# Patient Record
Sex: MALE | Race: BLACK OR AFRICAN AMERICAN | NOT HISPANIC OR LATINO | URBAN - METROPOLITAN AREA
[De-identification: names, ages, dates, MRNs, and addresses within clinical notes are randomized per-mention and may not be internally consistent; named-entity substitution may affect disease eponyms.]

---

## 2024-03-11 ENCOUNTER — APPOINTMENT (OUTPATIENT)
Dept: URBAN - METROPOLITAN AREA CLINIC 208 | Age: 49
Setting detail: DERMATOLOGY
End: 2024-03-21

## 2024-03-11 DIAGNOSIS — L21.8 OTHER SEBORRHEIC DERMATITIS: ICD-10-CM

## 2024-03-11 DIAGNOSIS — Q81.2 EPIDERMOLYSIS BULLOSA DYSTROPHICA: ICD-10-CM

## 2024-03-11 PROCEDURE — OTHER OTC TREATMENT REGIMEN: OTHER

## 2024-03-11 PROCEDURE — OTHER PRESCRIPTION: OTHER

## 2024-03-11 PROCEDURE — OTHER SUNSCREEN RECOMMENDATIONS: OTHER

## 2024-03-11 PROCEDURE — OTHER MIPS QUALITY: OTHER

## 2024-03-11 PROCEDURE — OTHER COUNSELING: OTHER

## 2024-03-11 PROCEDURE — OTHER ADDITIONAL NOTES: OTHER

## 2024-03-11 PROCEDURE — 99204 OFFICE O/P NEW MOD 45 MIN: CPT

## 2024-03-11 RX ORDER — KETOCONAZOLE 20 MG/ML
SHAMPOO, SUSPENSION TOPICAL
Qty: 120 | Refills: 3 | Status: ERX | COMMUNITY
Start: 2024-03-11

## 2024-03-11 RX ORDER — KETOCONAZOLE 20 MG/G
CREAM TOPICAL
Qty: 30 | Refills: 3 | Status: ERX | COMMUNITY
Start: 2024-03-11

## 2024-03-11 RX ORDER — FLUTICASONE PROPIONATE 0.05 MG/G
OINTMENT TOPICAL
Qty: 30 | Refills: 1 | Status: ERX | COMMUNITY
Start: 2024-03-11

## 2024-03-11 ASSESSMENT — LOCATION SIMPLE DESCRIPTION DERM
LOCATION SIMPLE: RIGHT FOREARM
LOCATION SIMPLE: LEFT FOREARM
LOCATION SIMPLE: SUPERIOR FOREHEAD
LOCATION SIMPLE: FRONTAL SCALP

## 2024-03-11 ASSESSMENT — LOCATION ZONE DERM
LOCATION ZONE: FACE
LOCATION ZONE: SCALP
LOCATION ZONE: ARM

## 2024-03-11 ASSESSMENT — LOCATION DETAILED DESCRIPTION DERM
LOCATION DETAILED: LEFT PROXIMAL RADIAL DORSAL FOREARM
LOCATION DETAILED: MEDIAL FRONTAL SCALP
LOCATION DETAILED: SUPERIOR MID FOREHEAD
LOCATION DETAILED: RIGHT PROXIMAL RADIAL DORSAL FOREARM

## 2024-03-11 NOTE — HPI: RASH
What Type Of Note Output Would You Prefer (Optional)?: Bullet Format
How Severe Is Your Rash?: moderate
Is This A New Presentation, Or A Follow-Up?: Rash
Additional History: Patient states that to cut the long story short on January the 5th he was in the ER for burns to his face from working on his furnace which affected his eyes. He was given Mupirocin a d told to use Aquaphor as well. 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 Was given nystatin ointment to use on the face. But he used on left forearm to test out medication first.\\nGot burning reaction and blisters from the Nystatin ointment. He called his dermatologist as was told to d/c due to possible allergy to the nystatin and was given Desonide. Rash is spreading and pt is concerned could be Morrisbreana Guajardo’s. Was prescribed Triamcinolone ointment but he never filled it.\\nPer Dermatologist states rash could be allergy to the preservatives in the nystatin: Propylene glycol; took NDC #(have medication here)\\nSpreading to right arm that he never tested on. Was told could be allergic to something in the environment or something he is using. States using All Free and Clear. Contacted doctor and was told not systemic.\\nUsed Mupirocin ointment on one section of the left arm to compare with the desonide(used for 1 day). Tested out the nystatin x 1 day.\\nBurning sensation on onset of Nystatin use. Soreness in muscle on lower legs.\\n\\nPt states he is concerned about using anything on his skin due to fear of reacting adversely.\\n\\nPt has pictures.

## 2024-03-11 NOTE — PROCEDURE: OTC TREATMENT REGIMEN
Patient Specific Otc Recommendations (Will Not Stick From Patient To Patient): Head and Shoulders or Nizarol shampoo
Detail Level: Zone

## 2024-03-11 NOTE — PROCEDURE: SUNSCREEN RECOMMENDATIONS
Products Recommended: Recommended Sunscreen Lines:\\n\\nFor the face:\\nVaniCream Sunscreen
Detail Level: Generalized
General Sunscreen Counseling: I recommended a broad spectrum sunscreen with a SPF of 30 or higher.  I explained that SPF 30 sunscreens block approximately 97 percent of the sun's harmful rays.  Sunscreens should be applied at least 15 minutes prior to expected sun exposure and then every 2 hours after that as long as sun exposure continues. If swimming or exercising sunscreen should be reapplied every 45 minutes to an hour after getting wet or sweating.  Lotion/cream sunscreens are preferred and superior to spray sunscreens.  One ounce, or the equivalent of a shot glass full of sunscreen, is adequate to protect the skin not covered by a bathing suit. I also recommended a lip balm with a sunscreen as well. Sun protective clothing can be used in lieu of sunscreen but must be worn the entire time you are exposed to the sun's rays.

## 2024-03-11 NOTE — PROCEDURE: ADDITIONAL NOTES
Detail Level: Zone
Additional Notes: Regarding the patient's immune system, Dr. Vicente advised against using oral prednisone for suppression, as the patient has reported laura both Covid and Strep throat in the past 3 months.\\n\\nDuring the discussion, Dr. Vicente mentioned three options for treatment. The first option is to start with Mupirocin ointment for the arms. The second option is to try Fluticasone ointment. Dr. Vicente also highlighted the potential adverse effects of long-term use of topical steroids, which include skin atrophy and hyper/hypopigmentation. Lastly, the third option is to consider non-steroid treatments such as Tacrolimus for long-term use.\\n\\nAdvised patient to test out medication in small area before using on the affected areas.
Render Risk Assessment In Note?: no